# Patient Record
Sex: FEMALE | Race: WHITE | Employment: FULL TIME | URBAN - METROPOLITAN AREA
[De-identification: names, ages, dates, MRNs, and addresses within clinical notes are randomized per-mention and may not be internally consistent; named-entity substitution may affect disease eponyms.]

---

## 2018-08-25 ENCOUNTER — NURSE TRIAGE (OUTPATIENT)
Dept: NURSING | Facility: CLINIC | Age: 33
End: 2018-08-25

## 2018-08-25 NOTE — TELEPHONE ENCOUNTER
Luz feels she has a UTI.  Woke up and had painful urination and cloudy urine.  Luz denies fever or blood in urine.  Luz denies lower back pain.